# Patient Record
Sex: MALE | Race: WHITE | URBAN - METROPOLITAN AREA
[De-identification: names, ages, dates, MRNs, and addresses within clinical notes are randomized per-mention and may not be internally consistent; named-entity substitution may affect disease eponyms.]

---

## 2022-10-19 ENCOUNTER — OFFICE VISIT (OUTPATIENT)
Dept: URBAN - METROPOLITAN AREA CLINIC 71 | Facility: CLINIC | Age: 72
End: 2022-10-19
Payer: COMMERCIAL

## 2022-10-19 DIAGNOSIS — H25.13 AGE-RELATED NUCLEAR CATARACT, BILATERAL: ICD-10-CM

## 2022-10-19 DIAGNOSIS — G43.B0 OPHTHALMOPLEGIC MIGRAINE, NOT INTRACTABLE: ICD-10-CM

## 2022-10-19 DIAGNOSIS — H02.135 SENILE ECTROPION OF LEFT LOWER EYELID: Primary | ICD-10-CM

## 2022-10-19 DIAGNOSIS — H02.132 SENILE ECTROPION OF RIGHT LOWER EYELID: ICD-10-CM

## 2022-10-19 DIAGNOSIS — H43.813 BILATERAL VITREOUS DETACHMENT OF EYES: ICD-10-CM

## 2022-10-19 PROCEDURE — 92004 COMPRE OPH EXAM NEW PT 1/>: CPT | Performed by: PHYSICIAN ASSISTANT

## 2022-10-19 ASSESSMENT — VISUAL ACUITY
OS: 20/20
OD: 20/20

## 2022-10-19 ASSESSMENT — INTRAOCULAR PRESSURE
OD: 11
OS: 12

## 2022-10-19 NOTE — IMPRESSION/PLAN
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 Plan: Discussed diagnosis. Will refer to Dr. Mikhail Dial for eval and txt options. Pt interested in more permanent surgical correction vs lubrication. Answered all questions.

## 2022-10-19 NOTE — IMPRESSION/PLAN
Impression: Senile ectropion of right lower eyelid: H02.132. Plan: Discussed diagnosis. Will refer to Dr. Christina Rodriguez for eval and txt options. Pt interested in more permanent surgical correction vs lubrication. Answered all questions.

## 2022-10-19 NOTE — IMPRESSION/PLAN
Impression: Bilateral vitreous detachment of eyes: H43.813. Plan: Posterior vitreous detachment accounts for the patient's complaints. There is no evidence of retinal pathology. All signs and risks of retinal detachment and tears were discussed in detail. Patient instructed to call the office immediately if any symptoms noted. Recommend the patient return to office for follow up. Answered all questions and addressed all concerns. Optos ordered today showed no retinal pathology today.

## 2022-10-19 NOTE — IMPRESSION/PLAN
Impression: Age-related nuclear cataract, bilateral: H25.13. Plan: Cataracts account for the patient's complaints. Discussed formation of cataracts and progression. No treatment currently recommended. The patient will monitor vision changes and contact us with any decrease in vision. Answered all questions and addressed all concerns.